# Patient Record
Sex: FEMALE | Race: WHITE | NOT HISPANIC OR LATINO | URBAN - METROPOLITAN AREA
[De-identification: names, ages, dates, MRNs, and addresses within clinical notes are randomized per-mention and may not be internally consistent; named-entity substitution may affect disease eponyms.]

---

## 2023-05-31 ENCOUNTER — TELEPHONE (OUTPATIENT)
Dept: PRIMARY CARE | Facility: CLINIC | Age: 58
End: 2023-05-31
Payer: COMMERCIAL

## 2023-05-31 DIAGNOSIS — Z00.00 HEALTHCARE MAINTENANCE: Primary | ICD-10-CM

## 2023-05-31 DIAGNOSIS — E55.9 MILD VITAMIN D DEFICIENCY: ICD-10-CM

## 2023-05-31 DIAGNOSIS — E78.9 BORDERLINE HIGH CHOLESTEROL: ICD-10-CM

## 2023-05-31 NOTE — TELEPHONE ENCOUNTER
Patient scheduled for a HME on 6/9, would like to have the labs drawn on 6/5. Requesting orders be entered into Epic.

## 2023-06-01 PROBLEM — G50.9 TRIGEMINAL NEUROPATHY: Status: ACTIVE | Noted: 2023-06-01

## 2023-06-01 PROBLEM — E55.9 MILD VITAMIN D DEFICIENCY: Status: ACTIVE | Noted: 2023-06-01

## 2023-06-01 PROBLEM — E78.9 BORDERLINE HIGH CHOLESTEROL: Status: ACTIVE | Noted: 2023-06-01

## 2023-06-01 PROBLEM — K21.9 GERD (GASTROESOPHAGEAL REFLUX DISEASE): Status: ACTIVE | Noted: 2023-06-01

## 2023-06-01 PROBLEM — G93.9 PONTINE LESION: Status: ACTIVE | Noted: 2023-06-01

## 2023-06-01 PROBLEM — G37.9 DEMYELINATING DISEASE (MULTI): Status: ACTIVE | Noted: 2023-06-01

## 2023-06-01 PROBLEM — G43.909 MIGRAINE HEADACHE: Status: ACTIVE | Noted: 2023-06-01

## 2023-06-01 PROBLEM — N95.2 VAGINAL ATROPHY: Status: ACTIVE | Noted: 2023-06-01

## 2023-06-01 PROBLEM — I31.9 PERICARDITIS (HHS-HCC): Status: RESOLVED | Noted: 2023-06-01 | Resolved: 2023-06-01

## 2023-06-01 RX ORDER — ALBUTEROL SULFATE 90 UG/1
2 AEROSOL, METERED RESPIRATORY (INHALATION) EVERY 4 HOURS PRN
COMMUNITY
Start: 2023-02-18 | End: 2023-06-09

## 2023-06-01 RX ORDER — CHOLECALCIFEROL (VITAMIN D3) 25 MCG
25 TABLET ORAL DAILY
COMMUNITY
Start: 2022-05-02

## 2023-06-01 RX ORDER — PROGESTERONE 100 MG/1
100 CAPSULE ORAL DAILY
COMMUNITY

## 2023-06-01 RX ORDER — ESTRADIOL 0.03 MG/D
1 PATCH, EXTENDED RELEASE TRANSDERMAL 2 TIMES WEEKLY
COMMUNITY

## 2023-06-05 ENCOUNTER — CLINICAL SUPPORT (OUTPATIENT)
Dept: PRIMARY CARE | Facility: CLINIC | Age: 58
End: 2023-06-05
Payer: COMMERCIAL

## 2023-06-05 DIAGNOSIS — E78.9 BORDERLINE HIGH CHOLESTEROL: ICD-10-CM

## 2023-06-05 DIAGNOSIS — Z00.00 HEALTHCARE MAINTENANCE: Primary | ICD-10-CM

## 2023-06-05 DIAGNOSIS — E55.9 MILD VITAMIN D DEFICIENCY: ICD-10-CM

## 2023-06-05 LAB
ALANINE AMINOTRANSFERASE (SGPT) (U/L) IN SER/PLAS: 9 U/L (ref 7–45)
ALBUMIN (G/DL) IN SER/PLAS: 4.6 G/DL (ref 3.4–5)
ALKALINE PHOSPHATASE (U/L) IN SER/PLAS: 45 U/L (ref 33–110)
ANION GAP IN SER/PLAS: 11 MMOL/L (ref 10–20)
ASPARTATE AMINOTRANSFERASE (SGOT) (U/L) IN SER/PLAS: 14 U/L (ref 9–39)
BILIRUBIN TOTAL (MG/DL) IN SER/PLAS: 0.6 MG/DL (ref 0–1.2)
CALCIDIOL (25 OH VITAMIN D3) (NG/ML) IN SER/PLAS: 70 NG/ML
CALCIUM (MG/DL) IN SER/PLAS: 10.1 MG/DL (ref 8.6–10.6)
CARBON DIOXIDE, TOTAL (MMOL/L) IN SER/PLAS: 28 MMOL/L (ref 21–32)
CHLORIDE (MMOL/L) IN SER/PLAS: 105 MMOL/L (ref 98–107)
CHOLESTEROL (MG/DL) IN SER/PLAS: 197 MG/DL (ref 0–199)
CHOLESTEROL IN HDL (MG/DL) IN SER/PLAS: 73.1 MG/DL
CHOLESTEROL/HDL RATIO: 2.7
CREATININE (MG/DL) IN SER/PLAS: 0.68 MG/DL (ref 0.5–1.05)
GFR FEMALE: >90 ML/MIN/1.73M2
GLUCOSE (MG/DL) IN SER/PLAS: 92 MG/DL (ref 74–99)
HIV 1/ 2 AG/AB SCREEN: NONREACTIVE
LDL: 113 MG/DL (ref 0–99)
POTASSIUM (MMOL/L) IN SER/PLAS: 5 MMOL/L (ref 3.5–5.3)
PROTEIN TOTAL: 7.4 G/DL (ref 6.4–8.2)
SODIUM (MMOL/L) IN SER/PLAS: 139 MMOL/L (ref 136–145)
TRIGLYCERIDE (MG/DL) IN SER/PLAS: 54 MG/DL (ref 0–149)
UREA NITROGEN (MG/DL) IN SER/PLAS: 20 MG/DL (ref 6–23)
VLDL: 11 MG/DL (ref 0–40)

## 2023-06-05 PROCEDURE — 82306 VITAMIN D 25 HYDROXY: CPT

## 2023-06-05 PROCEDURE — 87389 HIV-1 AG W/HIV-1&-2 AB AG IA: CPT

## 2023-06-05 PROCEDURE — 80053 COMPREHEN METABOLIC PANEL: CPT

## 2023-06-05 PROCEDURE — 80061 LIPID PANEL: CPT

## 2023-06-09 ENCOUNTER — OFFICE VISIT (OUTPATIENT)
Dept: PRIMARY CARE | Facility: CLINIC | Age: 58
End: 2023-06-09
Payer: COMMERCIAL

## 2023-06-09 VITALS
TEMPERATURE: 97.4 F | RESPIRATION RATE: 16 BRPM | WEIGHT: 151.6 LBS | HEART RATE: 67 BPM | BODY MASS INDEX: 25.26 KG/M2 | HEIGHT: 65 IN | DIASTOLIC BLOOD PRESSURE: 66 MMHG | SYSTOLIC BLOOD PRESSURE: 114 MMHG | OXYGEN SATURATION: 99 %

## 2023-06-09 DIAGNOSIS — Z00.00 HEALTHCARE MAINTENANCE: Primary | ICD-10-CM

## 2023-06-09 DIAGNOSIS — M21.619 BUNION OF GREAT TOE: ICD-10-CM

## 2023-06-09 DIAGNOSIS — E55.9 MILD VITAMIN D DEFICIENCY: ICD-10-CM

## 2023-06-09 DIAGNOSIS — R20.0 NUMBNESS OF TOES: ICD-10-CM

## 2023-06-09 PROBLEM — E78.9 BORDERLINE HIGH CHOLESTEROL: Status: RESOLVED | Noted: 2023-06-01 | Resolved: 2023-06-09

## 2023-06-09 PROCEDURE — 1036F TOBACCO NON-USER: CPT | Performed by: FAMILY MEDICINE

## 2023-06-09 PROCEDURE — 99396 PREV VISIT EST AGE 40-64: CPT | Performed by: FAMILY MEDICINE

## 2023-06-09 ASSESSMENT — ENCOUNTER SYMPTOMS
SORE THROAT: 0
VOMITING: 0
SHORTNESS OF BREATH: 0
EYE DISCHARGE: 0
CONFUSION: 0
FEVER: 0
DIFFICULTY URINATING: 0
ADENOPATHY: 0
AGITATION: 0
MYALGIAS: 0
BLOOD IN STOOL: 0
DIZZINESS: 0
HEADACHES: 0
NUMBNESS: 1
RHINORRHEA: 0
POLYDIPSIA: 0
ABDOMINAL DISTENTION: 0
FREQUENCY: 0
WEAKNESS: 0
BACK PAIN: 0
ABDOMINAL PAIN: 0
DYSURIA: 0
PALPITATIONS: 0
CONSTIPATION: 0
NAUSEA: 0
HALLUCINATIONS: 0
APPETITE CHANGE: 0
CHILLS: 0
COUGH: 0
DIARRHEA: 0
SINUS PAIN: 0

## 2023-06-09 NOTE — PROGRESS NOTES
Subjective   Patient ID: Aurelia Castillo is a 57 y.o. female who presents for Annual Exam (Pt requests Tdap. L 4th toe goes numb and feels like walking on marbles since 09/2022 and bunion on R foot.).  Well Adult Physical   Patient here for a comprehensive physical exam.     Diet: Well-balanced    Exercise: Yoga, CrossFit on a regular basis    Social History    Tobacco Use      Smoking status: Never      Smokeless tobacco: Never    Vaping Use      Vaping status: Never Used    Alcohol use: Yes        Immunization History  Administered            Date(s) Administered    Influenza, Unspecified                          09/23/2009  09/27/2010  12/10/2012                            09/01/2013  09/04/2013      Influenza, injectable, quadrivalent                          10/25/2019      Influenza, seasonal, injectable                          10/15/2020  10/01/2021  10/05/2022      Pfizer Gray Cap SARS-CoV-2                          08/26/2022      Pfizer Purple Cap SARS-CoV-2                          01/26/2021 02/16/2021 11/27/2021      Pneumococcal Polysaccharide PPSV23                          09/27/2010      Tdap                  02/02/2018      Menstrual cycle   When was your last period:   How often do they occur:   Do you have any concerns about your period:      Last pap: Seeing GYN next week  History of abnormal pap: Once in her early 20s    Last mammogram: June 2022    Last colon cancer screening: She thinks 2019 with GI at [facility], she will follow-up on this                   Review of Systems   Constitutional:  Negative for appetite change, chills and fever.   HENT:  Negative for ear pain, rhinorrhea, sinus pain and sore throat.    Eyes:  Negative for discharge and visual disturbance.   Respiratory:  Negative for cough and shortness of breath.    Cardiovascular:  Negative for chest pain, palpitations and leg swelling.   Gastrointestinal:  Negative for abdominal distention, abdominal pain, blood in  stool, constipation, diarrhea, nausea and vomiting.   Endocrine: Negative for cold intolerance, heat intolerance, polydipsia and polyuria.   Genitourinary:  Negative for difficulty urinating, dysuria and frequency.   Musculoskeletal:  Negative for back pain and myalgias.   Skin:  Negative for rash.   Neurological:  Positive for numbness (In fourth digit on left foot feels like she is walking on a marble between the bases of her fourth and fifth toes). Negative for dizziness, weakness and headaches.   Hematological:  Negative for adenopathy.   Psychiatric/Behavioral:  Negative for agitation, confusion and hallucinations.        Objective   Physical Exam  Constitutional:       Appearance: Normal appearance.   Cardiovascular:      Rate and Rhythm: Normal rate and regular rhythm.   Pulmonary:      Effort: Pulmonary effort is normal.      Breath sounds: Normal breath sounds.   Abdominal:      General: Abdomen is flat. Bowel sounds are normal.      Palpations: Abdomen is soft.   Musculoskeletal:      Comments: Moderate bunion on right great toe, inspection and palpation of left foot normal   Skin:     General: Skin is warm and dry.   Neurological:      Mental Status: She is alert.   Psychiatric:         Mood and Affect: Mood normal.         Behavior: Behavior normal.         Assessment/Plan   Problem List Items Addressed This Visit       Mild vitamin D deficiency    Healthcare maintenance - Primary    Relevant Orders    Referral to Podiatry    Numbness of toes     Possible Majano's neuroma, referred to podiatry         Bunion of great toe     Referred to podiatry        Follow up in 1 year

## 2023-09-29 ENCOUNTER — OFFICE VISIT (OUTPATIENT)
Dept: PRIMARY CARE | Facility: CLINIC | Age: 58
End: 2023-09-29
Payer: COMMERCIAL

## 2023-09-29 VITALS
DIASTOLIC BLOOD PRESSURE: 75 MMHG | BODY MASS INDEX: 24.4 KG/M2 | HEART RATE: 76 BPM | HEIGHT: 66 IN | SYSTOLIC BLOOD PRESSURE: 112 MMHG | TEMPERATURE: 98.6 F | WEIGHT: 151.8 LBS | OXYGEN SATURATION: 98 %

## 2023-09-29 DIAGNOSIS — J20.9 ACUTE BRONCHITIS, UNSPECIFIED ORGANISM: Primary | ICD-10-CM

## 2023-09-29 DIAGNOSIS — Z20.822 SUSPECTED COVID-19 VIRUS INFECTION: ICD-10-CM

## 2023-09-29 PROCEDURE — 1036F TOBACCO NON-USER: CPT | Performed by: FAMILY MEDICINE

## 2023-09-29 PROCEDURE — 99214 OFFICE O/P EST MOD 30 MIN: CPT | Performed by: FAMILY MEDICINE

## 2023-09-29 PROCEDURE — 87635 SARS-COV-2 COVID-19 AMP PRB: CPT

## 2023-09-29 RX ORDER — DOXYCYCLINE 100 MG/1
100 CAPSULE ORAL 2 TIMES DAILY
Qty: 20 CAPSULE | Refills: 0 | Status: SHIPPED | OUTPATIENT
Start: 2023-09-29 | End: 2023-10-09

## 2023-09-29 ASSESSMENT — PAIN SCALES - GENERAL: PAINLEVEL: 0-NO PAIN

## 2023-09-29 NOTE — PROGRESS NOTES
Subjective   Patient ID: Aurelia Castillo is a 58 y.o. female who presents for Cough and Nasal Congestion.  HPI    1 week ago started with head congestion, rhinorrhea, postnasal drainage.  Overall she is feeling much better but she is left with a more dry cough that often starts as a tickle in the back of her throat.      Illness really began 9/18/2023    Review of Systems    Objective   Physical Exam  Constitutional:       Appearance: Normal appearance.   HENT:      Head: Normocephalic and atraumatic.      Right Ear: Tympanic membrane normal.      Left Ear: Tympanic membrane normal.      Mouth/Throat:      Comments: Yellow postnasal drainage  Cardiovascular:      Rate and Rhythm: Normal rate and regular rhythm.   Pulmonary:      Effort: Pulmonary effort is normal.      Breath sounds: Normal breath sounds.   Skin:     General: Skin is warm and dry.   Neurological:      Mental Status: She is alert.         Assessment/Plan   Problem List Items Addressed This Visit    None  Visit Diagnoses       Acute bronchitis, unspecified organism    -  Primary    Relevant Medications    doxycycline (Vibramycin) 100 mg capsule    Suspected COVID-19 virus infection        Relevant Orders    Sars-CoV-2 PCR, Symptomatic          Follow-up as needed

## 2023-09-30 LAB — SARS-COV-2 RESULT: NOT DETECTED

## 2024-01-25 PROBLEM — K21.9 LARYNGOPHARYNGEAL REFLUX: Status: RESOLVED | Noted: 2022-03-29 | Resolved: 2024-01-25

## 2024-01-25 PROBLEM — G43.019 INTRACTABLE MIGRAINE WITHOUT AURA: Status: RESOLVED | Noted: 2024-01-25 | Resolved: 2024-01-25

## 2024-01-25 PROBLEM — C44.91 MALIGNANT BASAL CELL NEOPLASM OF SKIN: Status: RESOLVED | Noted: 2024-01-25 | Resolved: 2024-01-25

## 2024-01-25 RX ORDER — CYCLOBENZAPRINE HCL 10 MG
10 TABLET ORAL EVERY 8 HOURS PRN
COMMUNITY
Start: 2024-01-13

## 2024-01-25 RX ORDER — GABAPENTIN 300 MG/1
600 CAPSULE ORAL 3 TIMES DAILY
COMMUNITY

## 2024-01-25 RX ORDER — BACLOFEN 10 MG/1
TABLET ORAL
COMMUNITY
Start: 2024-01-19

## 2024-06-19 ENCOUNTER — APPOINTMENT (OUTPATIENT)
Dept: PRIMARY CARE | Facility: CLINIC | Age: 59
End: 2024-06-19
Payer: COMMERCIAL

## 2024-06-19 VITALS
DIASTOLIC BLOOD PRESSURE: 60 MMHG | BODY MASS INDEX: 25.1 KG/M2 | RESPIRATION RATE: 18 BRPM | TEMPERATURE: 97 F | WEIGHT: 147 LBS | SYSTOLIC BLOOD PRESSURE: 110 MMHG | OXYGEN SATURATION: 98 % | HEIGHT: 64 IN | HEART RATE: 60 BPM

## 2024-06-19 DIAGNOSIS — G50.9 TRIGEMINAL NEUROPATHY: ICD-10-CM

## 2024-06-19 DIAGNOSIS — E55.9 MILD VITAMIN D DEFICIENCY: ICD-10-CM

## 2024-06-19 DIAGNOSIS — J30.9 ALLERGIC RHINITIS, UNSPECIFIED SEASONALITY, UNSPECIFIED TRIGGER: ICD-10-CM

## 2024-06-19 DIAGNOSIS — R20.0 NUMBNESS OF TOES: ICD-10-CM

## 2024-06-19 DIAGNOSIS — Z00.00 HEALTHCARE MAINTENANCE: Primary | ICD-10-CM

## 2024-06-19 DIAGNOSIS — H10.13 ALLERGIC CONJUNCTIVITIS OF BOTH EYES: ICD-10-CM

## 2024-06-19 PROCEDURE — 99396 PREV VISIT EST AGE 40-64: CPT | Performed by: FAMILY MEDICINE

## 2024-06-19 PROCEDURE — 80053 COMPREHEN METABOLIC PANEL: CPT

## 2024-06-19 PROCEDURE — 83735 ASSAY OF MAGNESIUM: CPT

## 2024-06-19 PROCEDURE — 80061 LIPID PANEL: CPT

## 2024-06-19 PROCEDURE — 36415 COLL VENOUS BLD VENIPUNCTURE: CPT

## 2024-06-19 PROCEDURE — 90739 HEPB VACC 2/4 DOSE ADULT IM: CPT | Performed by: FAMILY MEDICINE

## 2024-06-19 PROCEDURE — 82306 VITAMIN D 25 HYDROXY: CPT

## 2024-06-19 PROCEDURE — 90471 IMMUNIZATION ADMIN: CPT | Performed by: FAMILY MEDICINE

## 2024-06-19 RX ORDER — AZELASTINE HYDROCHLORIDE 0.5 MG/ML
1 SOLUTION/ DROPS OPHTHALMIC 2 TIMES DAILY PRN
Qty: 6 ML | Refills: 11 | Status: SHIPPED | OUTPATIENT
Start: 2024-06-19 | End: 2024-06-19

## 2024-06-19 RX ORDER — AZELASTINE HYDROCHLORIDE 0.5 MG/ML
1 SOLUTION/ DROPS OPHTHALMIC 2 TIMES DAILY PRN
Qty: 6 ML | Refills: 11 | Status: SHIPPED | OUTPATIENT
Start: 2024-06-19 | End: 2025-06-19

## 2024-06-19 RX ORDER — ASCORBIC ACID 500 MG
500 TABLET ORAL DAILY
COMMUNITY

## 2024-06-19 RX ORDER — MAGNESIUM GLYCINATE 100 MG
100 CAPSULE ORAL NIGHTLY
Start: 2024-06-19

## 2024-06-19 RX ORDER — GABAPENTIN 300 MG/1
600 CAPSULE ORAL 3 TIMES DAILY
Qty: 180 CAPSULE | Refills: 1 | Status: SHIPPED | OUTPATIENT
Start: 2024-06-19

## 2024-06-19 ASSESSMENT — ENCOUNTER SYMPTOMS
CONSTIPATION: 0
VOMITING: 0
MYALGIAS: 0
ABDOMINAL PAIN: 0
ADENOPATHY: 0
ABDOMINAL DISTENTION: 0
BACK PAIN: 0
DIARRHEA: 0
EYE DISCHARGE: 0
PALPITATIONS: 0
BLOOD IN STOOL: 0
DIZZINESS: 0
CONFUSION: 0
WEAKNESS: 0
SINUS PAIN: 0
HALLUCINATIONS: 0
HEADACHES: 0
FREQUENCY: 0
NAUSEA: 0
CHILLS: 0
SHORTNESS OF BREATH: 0
COUGH: 0
FEVER: 0
RHINORRHEA: 0
POLYDIPSIA: 0
APPETITE CHANGE: 0
AGITATION: 0
NUMBNESS: 0
DIFFICULTY URINATING: 0
DYSURIA: 0
SORE THROAT: 0

## 2024-06-19 NOTE — PROGRESS NOTES
Subjective   Patient ID: Aurelia Castillo is a 58 y.o. female who presents for Annual Exam.  Well Adult Physical   Patient here for a comprehensive physical exam.  Diet: Well-balanced    Exercise: CrossFit, yoga 3-5 times a week for about an hour    Social History    Tobacco Use      Smoking status: Never      Smokeless tobacco: Never    Vaping Use      Vaping status: Never Used    Alcohol use: Yes        Immunization History  Administered            Date(s) Administered    Hepatitis B vaccine, 18yrs and older(HEPLISAV)                          06/19/2024      Influenza, Unspecified                          09/23/2009  09/27/2010  12/10/2012                            09/01/2013  09/04/2013      Influenza, injectable, quadrivalent                          10/25/2019      Influenza, seasonal, injectable                          09/23/2009  09/27/2010  12/10/2012                            09/01/2013  09/04/2013  10/15/2020                            10/01/2021  10/05/2022      PPD Test              01/14/2008 01/23/2008      Pfizer COVID-19 vaccine, bivalent, age 12 years and older (30 mcg/0.3 mL)                          06/16/2023      Pfizer Gray Cap SARS-CoV-2                          08/26/2022      Pfizer Purple Cap SARS-CoV-2                          01/26/2021 02/16/2021 11/27/2021      Pneumococcal polysaccharide vaccine, 23-valent, age 2 years and older (PNEUMOVAX 23)                          09/27/2010      Tdap vaccine, age 7 year and older (BOOSTRIX, ADACEL)                          02/02/2018      Zoster vaccine, recombinant, adult (SHINGRIX)                          10/01/2021  12/17/2021      Menstrual cycle   When was your last period: 2013     Last pap: Per gynecology  History of abnormal pap:    Last mammogram: August 2023    Last colon cancer screening: Colonoscopy completed 9/8/2023                   Had RSV and making a case of bronchitis in December and January and unfortunately had  a pretty significant flareup of her trigeminal neuralgia for which she saw neurology at the [facility].  Notes reviewed with patient    Patient is also complaining of itchy eyes more so on the left than on the right.  She states that it does come and go but it is present most of the time.    Review of Systems   Constitutional:  Negative for appetite change, chills and fever.   HENT:  Negative for ear pain, rhinorrhea, sinus pain and sore throat.    Eyes:  Negative for discharge and visual disturbance.   Respiratory:  Negative for cough and shortness of breath.    Cardiovascular:  Negative for chest pain, palpitations and leg swelling.   Gastrointestinal:  Negative for abdominal distention, abdominal pain, blood in stool, constipation, diarrhea, nausea and vomiting.   Endocrine: Negative for cold intolerance, heat intolerance, polydipsia and polyuria.   Genitourinary:  Negative for difficulty urinating, dysuria and frequency.   Musculoskeletal:  Negative for back pain and myalgias.   Skin:  Negative for rash.   Neurological:  Negative for dizziness, weakness, numbness and headaches.   Hematological:  Negative for adenopathy.   Psychiatric/Behavioral:  Negative for agitation, confusion and hallucinations.        Objective   Physical Exam  Constitutional:       Appearance: Normal appearance.   Cardiovascular:      Rate and Rhythm: Normal rate and regular rhythm.   Pulmonary:      Effort: Pulmonary effort is normal.      Breath sounds: Normal breath sounds.   Abdominal:      General: Abdomen is flat. Bowel sounds are normal.      Palpations: Abdomen is soft.   Skin:     General: Skin is warm and dry.   Neurological:      Mental Status: She is alert.   Psychiatric:         Mood and Affect: Mood normal.         Behavior: Behavior normal.         Assessment/Plan   Problem List Items Addressed This Visit       Mild vitamin D deficiency    Relevant Orders    Vitamin D 25-Hydroxy,Total (for eval of Vitamin D levels)     Magnesium    Trigeminal neuropathy    Relevant Medications    gabapentin (Neurontin) 300 mg capsule    Other Relevant Orders    Magnesium    Healthcare maintenance - Primary    Relevant Orders    Comprehensive Metabolic Panel    Lipid Panel    Vitamin D 25-Hydroxy,Total (for eval of Vitamin D levels)    Magnesium    Numbness of toes    Relevant Orders    Magnesium     Other Visit Diagnoses       Allergic rhinitis, unspecified seasonality, unspecified trigger        Relevant Orders    Magnesium    Allergic conjunctivitis of both eyes        Relevant Medications    azelastine (Optivar) 0.05 % ophthalmic solution        Follow-up in 6 months for trigeminal neuralgia

## 2024-06-20 LAB
25(OH)D3 SERPL-MCNC: 83 NG/ML (ref 30–100)
ALBUMIN SERPL BCP-MCNC: 4.8 G/DL (ref 3.4–5)
ALP SERPL-CCNC: 45 U/L (ref 33–110)
ALT SERPL W P-5'-P-CCNC: 8 U/L (ref 7–45)
ANION GAP SERPL CALC-SCNC: 14 MMOL/L (ref 10–20)
AST SERPL W P-5'-P-CCNC: 20 U/L (ref 9–39)
BILIRUB SERPL-MCNC: 0.4 MG/DL (ref 0–1.2)
BUN SERPL-MCNC: 21 MG/DL (ref 6–23)
CALCIUM SERPL-MCNC: 9.8 MG/DL (ref 8.6–10.6)
CHLORIDE SERPL-SCNC: 103 MMOL/L (ref 98–107)
CHOLEST SERPL-MCNC: 192 MG/DL (ref 0–199)
CHOLESTEROL/HDL RATIO: 2.9
CO2 SERPL-SCNC: 25 MMOL/L (ref 21–32)
CREAT SERPL-MCNC: 0.76 MG/DL (ref 0.5–1.05)
EGFRCR SERPLBLD CKD-EPI 2021: >90 ML/MIN/1.73M*2
GLUCOSE SERPL-MCNC: 84 MG/DL (ref 74–99)
HDLC SERPL-MCNC: 67.1 MG/DL
LDLC SERPL CALC-MCNC: 114 MG/DL
MAGNESIUM SERPL-MCNC: 2.41 MG/DL (ref 1.6–2.4)
NON HDL CHOLESTEROL: 125 MG/DL (ref 0–149)
POTASSIUM SERPL-SCNC: 4.7 MMOL/L (ref 3.5–5.3)
PROT SERPL-MCNC: 7.3 G/DL (ref 6.4–8.2)
SODIUM SERPL-SCNC: 137 MMOL/L (ref 136–145)
TRIGL SERPL-MCNC: 55 MG/DL (ref 0–149)
VLDL: 11 MG/DL (ref 0–40)

## 2024-07-19 ENCOUNTER — APPOINTMENT (OUTPATIENT)
Dept: PRIMARY CARE | Facility: CLINIC | Age: 59
End: 2024-07-19
Payer: COMMERCIAL

## 2024-07-19 DIAGNOSIS — E55.9 MILD VITAMIN D DEFICIENCY: ICD-10-CM

## 2024-07-19 PROCEDURE — 90739 HEPB VACC 2/4 DOSE ADULT IM: CPT | Performed by: FAMILY MEDICINE

## 2024-07-19 PROCEDURE — 90471 IMMUNIZATION ADMIN: CPT | Performed by: FAMILY MEDICINE

## 2024-10-04 ENCOUNTER — CLINICAL SUPPORT (OUTPATIENT)
Dept: PRIMARY CARE | Facility: CLINIC | Age: 59
End: 2024-10-04
Payer: COMMERCIAL

## 2024-10-04 PROCEDURE — 90471 IMMUNIZATION ADMIN: CPT | Performed by: FAMILY MEDICINE

## 2024-10-04 PROCEDURE — 90656 IIV3 VACC NO PRSV 0.5 ML IM: CPT | Performed by: FAMILY MEDICINE

## 2024-12-20 ENCOUNTER — APPOINTMENT (OUTPATIENT)
Dept: PRIMARY CARE | Facility: CLINIC | Age: 59
End: 2024-12-20
Payer: COMMERCIAL

## 2024-12-20 VITALS
DIASTOLIC BLOOD PRESSURE: 57 MMHG | RESPIRATION RATE: 16 BRPM | HEART RATE: 74 BPM | WEIGHT: 144 LBS | BODY MASS INDEX: 24.72 KG/M2 | OXYGEN SATURATION: 98 % | SYSTOLIC BLOOD PRESSURE: 109 MMHG | TEMPERATURE: 97.8 F

## 2024-12-20 DIAGNOSIS — Z00.00 HEALTHCARE MAINTENANCE: ICD-10-CM

## 2024-12-20 DIAGNOSIS — B00.1 HERPES LABIALIS: Primary | ICD-10-CM

## 2024-12-20 DIAGNOSIS — Z79.899 MEDICATION MANAGEMENT: ICD-10-CM

## 2024-12-20 DIAGNOSIS — G50.9 TRIGEMINAL NEUROPATHY: ICD-10-CM

## 2024-12-20 DIAGNOSIS — E55.9 MILD VITAMIN D DEFICIENCY: ICD-10-CM

## 2024-12-20 PROCEDURE — 1036F TOBACCO NON-USER: CPT | Performed by: FAMILY MEDICINE

## 2024-12-20 PROCEDURE — 99214 OFFICE O/P EST MOD 30 MIN: CPT | Performed by: FAMILY MEDICINE

## 2024-12-20 RX ORDER — BACLOFEN 10 MG/1
10 TABLET ORAL 3 TIMES DAILY
COMMUNITY

## 2024-12-20 RX ORDER — VALACYCLOVIR HYDROCHLORIDE 500 MG/1
TABLET, FILM COATED ORAL
Qty: 6 TABLET | Refills: 11 | Status: SHIPPED | OUTPATIENT
Start: 2024-12-20

## 2024-12-20 RX ORDER — GABAPENTIN 300 MG/1
600 CAPSULE ORAL 3 TIMES DAILY
Qty: 180 CAPSULE | Refills: 1 | Status: SHIPPED | OUTPATIENT
Start: 2024-12-20

## 2024-12-20 NOTE — ASSESSMENT & PLAN NOTE
Orders:    Lipid Panel; Future    Comprehensive Metabolic Panel; Future    Vitamin D 25-Hydroxy,Total (for eval of Vitamin D levels); Future

## 2024-12-20 NOTE — PROGRESS NOTES
Subjective   Patient ID: Aurelia Castillo is a 59 y.o. female who presents for Follow-up (Nerve Pain).  HPI    Working hard to lower stress, manage diet and sleep.  Tolerating vitamins well.  Overall patient states that she manages and practices good self-care the discomfort is usually much improved.  She has not needed to take the gabapentin in quite a while nor has she needed to take the valacyclovir.  She does take the baclofen.      Review of Systems    Objective   Physical Exam  Constitutional:       Appearance: Normal appearance.   Cardiovascular:      Rate and Rhythm: Normal rate and regular rhythm.   Pulmonary:      Effort: Pulmonary effort is normal.      Breath sounds: Normal breath sounds.   Skin:     General: Skin is warm and dry.   Neurological:      Mental Status: She is alert.   Psychiatric:         Mood and Affect: Mood normal.         Behavior: Behavior normal.         Assessment & Plan  Trigeminal neuropathy    Orders:    gabapentin (Neurontin) 300 mg capsule; Take 2 capsules (600 mg) by mouth 3 times a day.    Herpes labialis    Orders:    valACYclovir (Valtrex) 500 mg tablet; Take 1000 mg every 12 hours    Healthcare maintenance    Orders:    Lipid Panel; Future    Comprehensive Metabolic Panel; Future    Vitamin D 25-Hydroxy,Total (for eval of Vitamin D levels); Future    Medication management    Orders:    Lipid Panel; Future    Comprehensive Metabolic Panel; Future    Vitamin D 25-Hydroxy,Total (for eval of Vitamin D levels); Future    Mild vitamin D deficiency    Orders:    Vitamin D 25-Hydroxy,Total (for eval of Vitamin D levels); Future     Follow up in 6 mos for [facility]

## 2025-01-31 ENCOUNTER — OFFICE VISIT (OUTPATIENT)
Dept: PRIMARY CARE | Facility: CLINIC | Age: 60
End: 2025-01-31
Payer: COMMERCIAL

## 2025-01-31 VITALS
BODY MASS INDEX: 24.31 KG/M2 | TEMPERATURE: 97.5 F | WEIGHT: 141.6 LBS | SYSTOLIC BLOOD PRESSURE: 112 MMHG | OXYGEN SATURATION: 97 % | HEART RATE: 74 BPM | DIASTOLIC BLOOD PRESSURE: 79 MMHG | RESPIRATION RATE: 16 BRPM

## 2025-01-31 DIAGNOSIS — R53.81 MALAISE: ICD-10-CM

## 2025-01-31 DIAGNOSIS — J32.9 SINOBRONCHITIS: Primary | ICD-10-CM

## 2025-01-31 DIAGNOSIS — R09.81 NASAL CONGESTION: ICD-10-CM

## 2025-01-31 DIAGNOSIS — J40 SINOBRONCHITIS: Primary | ICD-10-CM

## 2025-01-31 DIAGNOSIS — R05.1 ACUTE COUGH: ICD-10-CM

## 2025-01-31 PROCEDURE — 99214 OFFICE O/P EST MOD 30 MIN: CPT | Performed by: NURSE PRACTITIONER

## 2025-01-31 PROCEDURE — 1036F TOBACCO NON-USER: CPT | Performed by: NURSE PRACTITIONER

## 2025-01-31 RX ORDER — DOXYCYCLINE 100 MG/1
100 CAPSULE ORAL 2 TIMES DAILY
Qty: 20 CAPSULE | Refills: 0 | Status: SHIPPED | OUTPATIENT
Start: 2025-01-31 | End: 2025-02-10

## 2025-01-31 RX ORDER — FLUTICASONE PROPIONATE 50 MCG
2 SPRAY, SUSPENSION (ML) NASAL DAILY
Qty: 16 G | Refills: 1 | Status: SHIPPED | OUTPATIENT
Start: 2025-01-31 | End: 2026-01-31

## 2025-01-31 ASSESSMENT — PATIENT HEALTH QUESTIONNAIRE - PHQ9
1. LITTLE INTEREST OR PLEASURE IN DOING THINGS: NOT AT ALL
2. FEELING DOWN, DEPRESSED OR HOPELESS: NOT AT ALL
SUM OF ALL RESPONSES TO PHQ9 QUESTIONS 1 AND 2: 0

## 2025-01-31 NOTE — PROGRESS NOTES
Subjective   Patient ID: Aurelia Castillo is a 59 y.o. female who presents for URI symptoms.    HPI   Patient in office with complaint of nasal congestion, cough, sinus pressure, and malaise x 7 days. She tested negative for COVID twice at home. The patient declines re-testing for COVID and testing for RSV and/or Influenza. No other symptoms or concerns today.     Review of Systems  Constitutional:  Negative for fever, activity change, appetite change, chills, diaphoresis, fatigue and unexpected weight change.   HENT:  Positive for nasal congestion, sinus pressure and malaise. Negative for ear discharge, ear pain, mouth sores, nosebleeds, postnasal drip and trouble swallowing; no loss of taste or smell.    Eyes:  Negative for pain, discharge, redness and itching.   Respiratory:  Positive for cough. Negative for apnea, choking, chest tightness, and stridor.    Cardiovascular:  Negative for chest pain, palpitations and leg swelling.   Gastrointestinal:  Negative for abdominal distention, abdominal pain, anal bleeding, blood in stool, constipation, diarrhea, nausea, rectal pain and vomiting.   Endocrine: Negative for cold intolerance and heat intolerance.   Genitourinary:  Negative for decreased urine volume, difficulty urinating, dyspareunia, dysuria, flank pain, frequency, hematuria, pelvic pain, urgency, vaginal bleeding, vaginal discharge and vaginal pain.   Musculoskeletal:  Negative for back pain, gait problem and neck pain.   Skin:  Negative for pallor and rash.   Neurological:  Negative for dizziness, syncope, weakness, light-headedness and headaches.   Hematological:  Negative for adenopathy. Does not bruise/bleed easily.   Psychiatric/Behavioral:  Negative for dysphoric mood. The patient is not nervous/anxious.      Objective   /79   Pulse 74   Temp 36.4 °C (97.5 °F) (Temporal)   Resp 16   Wt 64.2 kg (141 lb 9.6 oz)   SpO2 97%   BMI 24.31 kg/m²     Physical Exam  Constitutional:        Appearance: Normal appearance.   HENT:      Head: Normocephalic.      Right Ear: Tympanic membrane, ear canal and external ear normal.      Left Ear: Tympanic membrane, ear canal and external ear normal.      Nose: Congestion present.      Mouth/Throat:      Mouth: Mucous membranes are moist.      Pharynx: Oropharynx is clear. present. No oropharyngeal exudate.   Eyes:      General:         Right eye: No discharge.         Left eye: No discharge.      Conjunctiva/sclera: Conjunctivae normal.      Pupils: Pupils are equal, round, and reactive to light.   Cardiovascular:      Rate and Rhythm: Normal rate and regular rhythm.      Pulses: Normal pulses.      Heart sounds: Normal heart sounds.   Pulmonary:      Effort: Pulmonary effort is normal.      Breath sounds: Normal breath sounds.   Musculoskeletal:      Cervical back: Neck supple. No rigidity or tenderness.   Lymphadenopathy:      Cervical: No cervical adenopathy.   Skin:     Coloration: Skin is not jaundiced or pale.   Neurological:      General: No focal deficit present.      Mental Status: She is alert.   Psychiatric:         Mood and Affect: Mood normal.         Assessment/Plan     Exam findings reviewed with patient. Medications discussed with patient. The patient may use Tylenol for fever and pain control and Mucinex for cough. Advocated rest and proper hydration and standard Influenza, COVID, and RSV precautions. Emergent signs and symptoms reviewed: uncontrolled fever, shortness of breath or chest pain, SaO2 level <95% on room air, uncontrolled vomiting or diarrhea. The patient verbalized understanding. The patient knows where to get emergent help. We will call with lab results and update the patient on the plan of care. Follow up in 1 week or earlier if no improvement.

## 2025-01-31 NOTE — LETTER
1/31/2025     To whom it may concern:     Ms. Aurelia Castillo is currently under my medical care. Please excuse her from work. 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     Sincerely,     KISHA Christiansen

## 2025-03-21 ENCOUNTER — TELEPHONE (OUTPATIENT)
Dept: PRIMARY CARE | Facility: CLINIC | Age: 60
End: 2025-03-21
Payer: COMMERCIAL

## 2025-03-21 DIAGNOSIS — Z00.00 HEALTHCARE MAINTENANCE: Primary | ICD-10-CM

## 2025-03-21 NOTE — TELEPHONE ENCOUNTER
Aurelia Castillo phoned, [phone], She scheduled her physical for 6/24/25.  She is requesting an order to come into the office for her labs scheduled for 6/13/25 @ 9:00.

## 2025-04-28 ENCOUNTER — OFFICE VISIT (OUTPATIENT)
Dept: PRIMARY CARE | Facility: CLINIC | Age: 60
End: 2025-04-28
Payer: COMMERCIAL

## 2025-04-28 VITALS
HEART RATE: 71 BPM | WEIGHT: 147.2 LBS | BODY MASS INDEX: 25.27 KG/M2 | DIASTOLIC BLOOD PRESSURE: 82 MMHG | SYSTOLIC BLOOD PRESSURE: 120 MMHG | RESPIRATION RATE: 16 BRPM | OXYGEN SATURATION: 98 % | TEMPERATURE: 97.8 F

## 2025-04-28 DIAGNOSIS — J01.90 ACUTE SINUSITIS, RECURRENCE NOT SPECIFIED, UNSPECIFIED LOCATION: Primary | ICD-10-CM

## 2025-04-28 PROCEDURE — 1036F TOBACCO NON-USER: CPT | Performed by: FAMILY MEDICINE

## 2025-04-28 PROCEDURE — 99213 OFFICE O/P EST LOW 20 MIN: CPT | Performed by: FAMILY MEDICINE

## 2025-04-28 RX ORDER — VALACYCLOVIR HYDROCHLORIDE 1 G/1
1000 TABLET, FILM COATED ORAL 2 TIMES DAILY
COMMUNITY
Start: 2025-03-18

## 2025-04-28 RX ORDER — NEOMYCIN SULFATE, POLYMYXIN B SULFATE, AND DEXAMETHASONE 3.5; 10000; 1 MG/G; [USP'U]/G; MG/G
OINTMENT OPHTHALMIC 2 TIMES DAILY
COMMUNITY
Start: 2025-04-25

## 2025-04-28 RX ORDER — DOXYCYCLINE 100 MG/1
100 CAPSULE ORAL 2 TIMES DAILY
Qty: 20 CAPSULE | Refills: 0 | Status: SHIPPED | OUTPATIENT
Start: 2025-04-28 | End: 2025-05-08

## 2025-04-28 ASSESSMENT — PATIENT HEALTH QUESTIONNAIRE - PHQ9
SUM OF ALL RESPONSES TO PHQ9 QUESTIONS 1 AND 2: 0
2. FEELING DOWN, DEPRESSED OR HOPELESS: NOT AT ALL
1. LITTLE INTEREST OR PLEASURE IN DOING THINGS: NOT AT ALL

## 2025-04-28 NOTE — PROGRESS NOTES
Subjective   Patient ID: Aurelia Castillo is a 59 y.o. female who presents for Cough, Facial Pain, and Headache.  HPI    Started about 1 month ago with lots of head congestion.  Then flu to CA, overall was improving.  Now with worsening sinus pressure, PND, productive cough.  Headache.      No Fever or chills     Review of Systems    Objective   Physical Exam  Constitutional:       Appearance: She is ill-appearing (mild-moderate).   HENT:      Head: Normocephalic and atraumatic.      Right Ear: Tympanic membrane normal.      Left Ear: Tympanic membrane normal.      Nose: Nose normal.      Mouth/Throat:      Comments: .yellow post nasal drainage    Cardiovascular:      Rate and Rhythm: Normal rate and regular rhythm.   Pulmonary:      Effort: Pulmonary effort is normal.      Breath sounds: Normal breath sounds.   Skin:     General: Skin is warm and dry.   Neurological:      Mental Status: She is alert.         Assessment & Plan  Acute sinusitis, recurrence not specified, unspecified location    Orders:    doxycycline (Vibramycin) 100 mg capsule; Take 1 capsule (100 mg) by mouth 2 times a day for 10 days. Take with at least 8 ounces (large glass) of water, do not lie down for 30 minutes after       Follow up in 10 days if not improving

## 2025-04-29 ENCOUNTER — APPOINTMENT (OUTPATIENT)
Dept: PRIMARY CARE | Facility: CLINIC | Age: 60
End: 2025-04-29
Payer: COMMERCIAL

## 2025-05-23 ENCOUNTER — APPOINTMENT (OUTPATIENT)
Dept: PRIMARY CARE | Facility: CLINIC | Age: 60
End: 2025-05-23
Payer: COMMERCIAL

## 2025-05-24 LAB
25(OH)D3+25(OH)D2 SERPL-MCNC: 64 NG/ML (ref 30–100)
CHOLEST SERPL-MCNC: 169 MG/DL
CHOLEST/HDLC SERPL: 2.5 (CALC)
GLUCOSE P FAST SERPL-MCNC: 83 MG/DL (ref 65–99)
HDLC SERPL-MCNC: 67 MG/DL
LDLC SERPL CALC-MCNC: 92 MG/DL (CALC)
NONHDLC SERPL-MCNC: 102 MG/DL (CALC)
TRIGL SERPL-MCNC: 35 MG/DL

## 2025-06-13 ENCOUNTER — APPOINTMENT (OUTPATIENT)
Dept: PRIMARY CARE | Facility: CLINIC | Age: 60
End: 2025-06-13
Payer: COMMERCIAL

## 2025-06-23 NOTE — PROGRESS NOTES
Subjective   Patient ID: Aurelia Castillo is a 59 y.o. female who presents for Annual Exam.  Well Adult Physical   Patient here for a comprehensive physical exam.The patient reports problems -patient states recently her migraines have not been responding as well to Excedrin as they had in the past.  Particularly she notices they are worse when the barometric pressure changes drastically.    Patient also states she has been under a large amount of stress with relation to her parents getting older and becoming more frail as well as her daughter being pregnant with her second child    Diet: Well-balanced but has been stress eating more recently    Exercise: Regularly    Social History    Tobacco Use      Smoking status: Never      Smokeless tobacco: Never    Vaping Use      Vaping status: Never Used    Alcohol use: Yes        Immunization History  Administered            Date(s) Administered    COVID-19, mRNA, LNP-S, PF, 30 mcg/0.3 mL dose                          01/26/2021 02/16/2021 11/27/2021      Flu vaccine, trivalent, preservative free, age 6 months and greater (Fluarix/Fluzone/Flulaval)                          10/04/2024      Hepatitis B vaccine, 18yrs and older(HEPLISAV)                          06/19/2024 07/19/2024      Influenza, Unspecified                          09/23/2009  09/27/2010  12/10/2012                            09/01/2013  09/04/2013      Influenza, injectable, quadrivalent                          10/25/2019      Influenza, seasonal, injectable                          09/23/2009  09/27/2010  12/10/2012                            09/01/2013  09/04/2013  10/15/2020                            10/01/2021  10/05/2022      Moderna COVID-19 vaccine, 12 years and older (50mcg/0.5mL)(Spikevax)                          11/02/2024      PPD Test              01/14/2008 01/23/2008      Pfizer COVID-19 vaccine, bivalent, age 12 years and older (30 mcg/0.3 mL)                           06/16/2023      Pfizer Gray Cap SARS-CoV-2                          08/26/2022      Pneumococcal polysaccharide vaccine, 23-valent, age 2 years and older (PNEUMOVAX 23)                          09/27/2010      Tdap vaccine, age 7 year and older (BOOSTRIX, ADACEL)                          02/02/2018      Zoster vaccine, recombinant, adult (SHINGRIX)                          10/01/2021  12/17/2021      Menstrual cycle   When was your last period: Over 5 years ago     Last pap: Sees a gynecologist at the [facility]  History of abnormal pap:    Last mammogram: Will be ordered by gynecology    Last colon cancer screening: Colonoscopy in September 2023                   Review of Systems   Constitutional:  Negative for appetite change, chills and fever.   HENT:  Negative for ear pain, rhinorrhea, sinus pain and sore throat.    Eyes:  Negative for discharge and visual disturbance.   Respiratory:  Negative for cough and shortness of breath.    Cardiovascular:  Negative for chest pain, palpitations and leg swelling.   Gastrointestinal:  Negative for abdominal distention, abdominal pain, blood in stool, constipation, diarrhea, nausea and vomiting.   Endocrine: Negative for cold intolerance, heat intolerance, polydipsia and polyuria.   Genitourinary:  Negative for difficulty urinating, dysuria and frequency.   Musculoskeletal:  Negative for back pain and myalgias.   Skin:  Negative for rash.   Neurological:  Negative for dizziness, weakness, numbness and headaches.   Hematological:  Negative for adenopathy.   Psychiatric/Behavioral:  Negative for agitation, confusion and hallucinations.        Objective   Physical Exam  Constitutional:       Appearance: Normal appearance.   Cardiovascular:      Rate and Rhythm: Normal rate and regular rhythm.   Pulmonary:      Effort: Pulmonary effort is normal.      Breath sounds: Normal breath sounds.   Chest:   Breasts:     Breasts are symmetrical.      Right: No mass, nipple  discharge or skin change.      Left: No mass, nipple discharge or skin change.   Abdominal:      General: Abdomen is flat. Bowel sounds are normal.      Palpations: Abdomen is soft.   Lymphadenopathy:      Upper Body:      Right upper body: No axillary adenopathy.      Left upper body: No axillary adenopathy.   Skin:     General: Skin is warm and dry.   Neurological:      Mental Status: She is alert.   Psychiatric:         Mood and Affect: Mood normal.         Behavior: Behavior normal.         Assessment & Plan  Other migraine without status migrainosus, not intractable    Orders:    SUMAtriptan (Imitrex) 100 mg tablet; Take 1 tablet (100 mg) by mouth 1 time if needed for migraine.    Follow Up In [facility] - PCP - Established; Future    Healthcare maintenance  Referred pt to [facility]          Follow up in 6 mos for migraines      Initiate Treatment: Doxycycline 100mg, take 1 tablet daily with a large meal\\nOnexton, apply to face every morning Detail Level: Zone Render In Strict Bullet Format?: No Continue Regimen: Arazlo topical cream, apply as a spot treatment every night (patient can’t tolerate all over face) Plan: Follow-up in 10 weeks.

## 2025-06-24 ENCOUNTER — APPOINTMENT (OUTPATIENT)
Dept: PRIMARY CARE | Facility: CLINIC | Age: 60
End: 2025-06-24
Payer: COMMERCIAL

## 2025-06-24 VITALS
SYSTOLIC BLOOD PRESSURE: 106 MMHG | RESPIRATION RATE: 16 BRPM | TEMPERATURE: 97.8 F | HEART RATE: 67 BPM | BODY MASS INDEX: 25.16 KG/M2 | HEIGHT: 64 IN | WEIGHT: 147.4 LBS | DIASTOLIC BLOOD PRESSURE: 58 MMHG | OXYGEN SATURATION: 99 %

## 2025-06-24 DIAGNOSIS — G43.809 OTHER MIGRAINE WITHOUT STATUS MIGRAINOSUS, NOT INTRACTABLE: ICD-10-CM

## 2025-06-24 DIAGNOSIS — Z00.00 HEALTHCARE MAINTENANCE: Primary | ICD-10-CM

## 2025-06-24 PROCEDURE — 90707 MMR VACCINE SC: CPT | Performed by: FAMILY MEDICINE

## 2025-06-24 PROCEDURE — 99396 PREV VISIT EST AGE 40-64: CPT | Performed by: FAMILY MEDICINE

## 2025-06-24 PROCEDURE — 90471 IMMUNIZATION ADMIN: CPT | Performed by: FAMILY MEDICINE

## 2025-06-24 RX ORDER — ACETAMINOPHEN 500 MG
125 TABLET ORAL DAILY
COMMUNITY

## 2025-06-24 RX ORDER — SUMATRIPTAN SUCCINATE 100 MG/1
100 TABLET ORAL ONCE AS NEEDED
Qty: 9 TABLET | Refills: 11 | Status: SHIPPED | OUTPATIENT
Start: 2025-06-24 | End: 2026-06-24

## 2025-06-24 ASSESSMENT — ENCOUNTER SYMPTOMS
BLOOD IN STOOL: 0
ABDOMINAL DISTENTION: 0
BACK PAIN: 0
FREQUENCY: 0
DYSURIA: 0
DIARRHEA: 0
DIFFICULTY URINATING: 0
NAUSEA: 0
HEADACHES: 0
AGITATION: 0
MYALGIAS: 0
SHORTNESS OF BREATH: 0
PALPITATIONS: 0
RHINORRHEA: 0
VOMITING: 0
EYE DISCHARGE: 0
POLYDIPSIA: 0
SINUS PAIN: 0
ADENOPATHY: 0
APPETITE CHANGE: 0
ABDOMINAL PAIN: 0
WEAKNESS: 0
DIZZINESS: 0
CONSTIPATION: 0
FEVER: 0
CONFUSION: 0
COUGH: 0
CHILLS: 0
HALLUCINATIONS: 0
NUMBNESS: 0
SORE THROAT: 0

## 2025-06-24 NOTE — ASSESSMENT & PLAN NOTE
Orders:    SUMAtriptan (Imitrex) 100 mg tablet; Take 1 tablet (100 mg) by mouth 1 time if needed for migraine.    Follow Up In [facility] - PCP - Established; Future